# Patient Record
Sex: FEMALE | Race: WHITE
[De-identification: names, ages, dates, MRNs, and addresses within clinical notes are randomized per-mention and may not be internally consistent; named-entity substitution may affect disease eponyms.]

---

## 2017-01-30 ENCOUNTER — HOSPITAL ENCOUNTER (OUTPATIENT)
Dept: HOSPITAL 45 - C.LABSPEC | Age: 27
Discharge: HOME | End: 2017-01-30
Attending: OBSTETRICS & GYNECOLOGY
Payer: COMMERCIAL

## 2017-01-30 DIAGNOSIS — Z3A.00: ICD-10-CM

## 2017-01-30 DIAGNOSIS — O34.219: Primary | ICD-10-CM

## 2017-01-30 LAB
APPEARANCE UR: CLEAR
BILIRUB UR-MCNC: (no result) MG/DL
COLOR UR: YELLOW
MANUAL MICROSCOPIC REQUIRED?: NO
NITRITE UR QL STRIP: (no result)
PH UR STRIP: 5 [PH] (ref 4.5–7.5)
REVIEW REQ?: NO
SP GR UR STRIP: 1.02 (ref 1–1.03)
URINE BILL WITH OR WITHOUT MIC: (no result)
UROBILINOGEN UR-MCNC: (no result) MG/DL

## 2017-02-16 ENCOUNTER — HOSPITAL ENCOUNTER (OUTPATIENT)
Dept: HOSPITAL 45 - C.LAB1850 | Age: 27
Discharge: HOME | End: 2017-02-16
Attending: OBSTETRICS & GYNECOLOGY
Payer: COMMERCIAL

## 2017-02-16 DIAGNOSIS — Z3A.00: ICD-10-CM

## 2017-02-16 DIAGNOSIS — O34.219: Primary | ICD-10-CM

## 2017-02-16 LAB
BASOPHILS # BLD: 0.01 K/UL (ref 0–0.2)
BASOPHILS NFR BLD: 0.1 %
COMPLETE: YES
EOSINOPHIL NFR BLD AUTO: 235 K/UL (ref 130–400)
HCT VFR BLD CALC: 37.7 % (ref 37–47)
IG%: 0.3 %
IMM GRANULOCYTES NFR BLD AUTO: 26.6 %
LYMPHOCYTES # BLD: 2.66 K/UL (ref 1.2–3.4)
MCH RBC QN AUTO: 29.3 PG (ref 25–34)
MCHC RBC AUTO-ENTMCNC: 34.5 G/DL (ref 32–36)
MCV RBC AUTO: 84.9 FL (ref 80–100)
MONOCYTES NFR BLD: 4 %
NEUTROPHILS # BLD AUTO: 0.6 %
NEUTROPHILS NFR BLD AUTO: 68.4 %
PMV BLD AUTO: 12 FL (ref 7.4–10.4)
RBC # BLD AUTO: 4.44 M/UL (ref 4.2–5.4)
WBC # BLD AUTO: 10 K/UL (ref 4.8–10.8)

## 2017-02-21 LAB
CHLAMYDIA TRACH RNA***: NOT DETECTED
GC (NEIS GONORRHOEAE)RNA**: NOT DETECTED

## 2017-04-13 ENCOUNTER — HOSPITAL ENCOUNTER (OUTPATIENT)
Dept: HOSPITAL 45 - C.LAB1850 | Age: 27
End: 2017-04-13
Attending: OBSTETRICS & GYNECOLOGY
Payer: COMMERCIAL

## 2017-04-13 DIAGNOSIS — Z34.90: Primary | ICD-10-CM

## 2017-04-13 LAB — GTGD: 50 GRAMS

## 2017-07-11 ENCOUNTER — HOSPITAL ENCOUNTER (OUTPATIENT)
Dept: HOSPITAL 45 - C.LABSPEC | Age: 27
Discharge: HOME | End: 2017-07-11
Attending: OBSTETRICS & GYNECOLOGY
Payer: COMMERCIAL

## 2017-07-11 ENCOUNTER — HOSPITAL ENCOUNTER (OUTPATIENT)
Dept: HOSPITAL 45 - C.LAB1850 | Age: 27
Discharge: HOME | End: 2017-07-11
Attending: OBSTETRICS & GYNECOLOGY
Payer: COMMERCIAL

## 2017-07-11 DIAGNOSIS — Z34.83: Primary | ICD-10-CM

## 2017-07-11 LAB
APPEARANCE UR: CLEAR
BILIRUB UR-MCNC: (no result) MG/DL
COLOR UR: YELLOW
GTGD: 50 GRAMS
HCT VFR BLD CALC: 31.8 % (ref 37–47)
MANUAL MICROSCOPIC REQUIRED?: NO
NITRITE UR QL STRIP: (no result)
PH UR STRIP: 6.5 [PH] (ref 4.5–7.5)
REVIEW REQ?: NO
SP GR UR STRIP: 1.02 (ref 1–1.03)
URINE EPITHELIAL CELL AUTO: >30 /LPF (ref 0–5)
UROBILINOGEN UR-MCNC: (no result) MG/DL

## 2017-08-24 ENCOUNTER — HOSPITAL ENCOUNTER (OUTPATIENT)
Dept: HOSPITAL 45 - C.LABSPEC | Age: 27
Discharge: HOME | End: 2017-08-24
Attending: OBSTETRICS & GYNECOLOGY
Payer: COMMERCIAL

## 2017-08-24 DIAGNOSIS — Z34.83: Primary | ICD-10-CM

## 2017-09-14 ENCOUNTER — HOSPITAL ENCOUNTER (OUTPATIENT)
Dept: HOSPITAL 45 - C.LAB | Age: 27
Discharge: HOME | End: 2017-09-14
Attending: OBSTETRICS & GYNECOLOGY
Payer: COMMERCIAL

## 2017-09-14 VITALS
WEIGHT: 152.34 LBS | WEIGHT: 152.34 LBS | BODY MASS INDEX: 26.01 KG/M2 | HEIGHT: 64.02 IN | BODY MASS INDEX: 26.01 KG/M2 | HEIGHT: 64.02 IN

## 2017-09-14 DIAGNOSIS — Z01.818: Primary | ICD-10-CM

## 2017-09-14 LAB
APPEARANCE UR: CLEAR
BASOPHILS # BLD: 0.02 K/UL (ref 0–0.2)
BASOPHILS NFR BLD: 0.2 %
BILIRUB UR-MCNC: (no result) MG/DL
COLOR UR: YELLOW
COMPLETE: YES
EOSINOPHIL NFR BLD AUTO: 200 K/UL (ref 130–400)
HCT VFR BLD CALC: 33.2 % (ref 37–47)
IG%: 0.4 %
IMM GRANULOCYTES NFR BLD AUTO: 22.1 %
LYMPHOCYTES # BLD: 2.34 K/UL (ref 1.2–3.4)
MANUAL MICROSCOPIC REQUIRED?: NO
MCH RBC QN AUTO: 24.2 PG (ref 25–34)
MCHC RBC AUTO-ENTMCNC: 31.3 G/DL (ref 32–36)
MCV RBC AUTO: 77.4 FL (ref 80–100)
MONOCYTES NFR BLD: 4.7 %
NEUTROPHILS # BLD AUTO: 0.5 %
NEUTROPHILS NFR BLD AUTO: 72.1 %
NITRITE UR QL STRIP: (no result)
PH UR STRIP: 6 [PH] (ref 4.5–7.5)
RBC # BLD AUTO: 4.29 M/UL (ref 4.2–5.4)
REVIEW REQ?: NO
SP GR UR STRIP: 1.01 (ref 1–1.03)
URINE BILL WITH OR WITHOUT MIC: (no result)
URINE EPITHELIAL CELL AUTO: >30 /LPF (ref 0–5)
UROBILINOGEN UR-MCNC: (no result) MG/DL
WBC # BLD AUTO: 10.61 K/UL (ref 4.8–10.8)

## 2017-09-14 NOTE — PAT MEDICATION INSTRUCTIONS
Service Date


Sep 14, 2017.





Current Home Medication List


Multivit/Min/Iron/Fol Ac/Pren (Prenatal Vitamin), 1 TAB PO QAM





Medication Instructions


For Your Scheduled Surgery 





- Hold the following medications the morning of surgery:


Multivit/Min/Iron/Fol Ac/Pren (Prenatal Vitamin), 1 TAB PO QAM











If you have any questions please call us at [phone] or [phone] or 

[phone]

## 2017-09-17 ENCOUNTER — HOSPITAL ENCOUNTER (INPATIENT)
Dept: HOSPITAL 45 - C.OPB | Age: 27
LOS: 2 days | Discharge: HOME | End: 2017-09-19
Attending: OBSTETRICS & GYNECOLOGY | Admitting: OBSTETRICS & GYNECOLOGY
Payer: COMMERCIAL

## 2017-09-17 VITALS
HEIGHT: 64.02 IN | BODY MASS INDEX: 25.97 KG/M2 | BODY MASS INDEX: 25.97 KG/M2 | HEIGHT: 64.02 IN | WEIGHT: 152.12 LBS | WEIGHT: 152.12 LBS

## 2017-09-17 VITALS
DIASTOLIC BLOOD PRESSURE: 64 MMHG | HEART RATE: 92 BPM | OXYGEN SATURATION: 99 % | SYSTOLIC BLOOD PRESSURE: 101 MMHG | TEMPERATURE: 97.7 F

## 2017-09-17 VITALS — DIASTOLIC BLOOD PRESSURE: 76 MMHG | SYSTOLIC BLOOD PRESSURE: 118 MMHG | TEMPERATURE: 99.32 F | HEART RATE: 105 BPM

## 2017-09-17 VITALS — HEART RATE: 116 BPM | TEMPERATURE: 97.7 F | SYSTOLIC BLOOD PRESSURE: 111 MMHG | DIASTOLIC BLOOD PRESSURE: 72 MMHG

## 2017-09-17 VITALS
SYSTOLIC BLOOD PRESSURE: 106 MMHG | OXYGEN SATURATION: 98 % | DIASTOLIC BLOOD PRESSURE: 69 MMHG | HEART RATE: 96 BPM | TEMPERATURE: 97.88 F

## 2017-09-17 DIAGNOSIS — D64.9: ICD-10-CM

## 2017-09-17 DIAGNOSIS — N85.8: ICD-10-CM

## 2017-09-17 DIAGNOSIS — O22.43: ICD-10-CM

## 2017-09-17 DIAGNOSIS — O34.219: Primary | ICD-10-CM

## 2017-09-17 DIAGNOSIS — Z3A.40: ICD-10-CM

## 2017-09-17 LAB
EOSINOPHIL NFR BLD AUTO: 209 K/UL (ref 130–400)
HCT VFR BLD CALC: 33.3 % (ref 37–47)
MCH RBC QN AUTO: 24.9 PG (ref 25–34)
MCHC RBC AUTO-ENTMCNC: 32.1 G/DL (ref 32–36)
MCV RBC AUTO: 77.4 FL (ref 80–100)
PMV BLD AUTO: 12.3 FL (ref 7.4–10.4)
RBC # BLD AUTO: 4.3 M/UL (ref 4.2–5.4)
WBC # BLD AUTO: 14.31 K/UL (ref 4.8–10.8)

## 2017-09-17 PROCEDURE — 0HQ9XZZ REPAIR PERINEUM SKIN, EXTERNAL APPROACH: ICD-10-PCS | Performed by: OBSTETRICS & GYNECOLOGY

## 2017-09-17 RX ADMIN — Medication PRN MG: at 15:34

## 2017-09-17 RX ADMIN — Medication PRN MG: at 19:14

## 2017-09-17 RX ADMIN — DOCUSATE SODIUM SCH MG: 100 CAPSULE, LIQUID FILLED ORAL at 19:49

## 2017-09-17 NOTE — DELIVERY SUMMARY
DATE OF OPERATION:  2017

 

The patient is a 27-year-old  2, para 1-0-0-1 white female, who

presents on her due date in active labor.  Her prior pregnancy had been

complicated by an emergency  section.  The patient was requesting a

trial of labor at this pregnancy.  She progressed to full dilation after

rupture of membranes with clear fluids.  She pushed effectively over intact

perineum for delivery of a viable female infant.  Mouth and nasopharynx were

suctioned on the perineum.  The rest of the infant delivered easily and was

placed on the mother's abdomen for further stimulation and drying.  There was

vigorous crying and the infant was moving all 4 limbs.  The cord was clamped

and cut after 30 seconds and the placenta was expressed intact with a

3-vessel cord.  The first degree perineal laceration was repaired with 3-0

chromic in the usual fashion.  She does have an edematous external hemorrhoid

also present, measuring 2-3 cm in diameter.  It is not red or thrombosed at

this time.  Estimated blood loss is 300 mL.  Mother and infant were doing

well after delivery.

 

 

I attest to the content of the Intraoperative Record and any orders documented therein. Any exception
s are noted below.

## 2017-09-17 NOTE — ANESTHESIA PROCEDURE NOTE
Anesthesia Epidural Removal Nt


Date & Time


Sep 17, 2017 at 10:42





Vital Signs


Pain Intensity:  0.0





Notes


Mental Status:  alert / awake / arousable, participated in evaluation


Nausea / Vomiting:  adequately controlled


Pain:  adequately controlled


Airway Patency, RR, SpO2:  stable & adequate


BP & HR:  stable & adequate


Hydration State:  stable & adequate


Neuraxial Anesthesia:  was administered, sensory block is resolving


Anesthetic Complications:  no major complications apparent, pt satisfied with 

anesthetic care


Epidural:  removed without complications, with tip intact

## 2017-09-18 VITALS
TEMPERATURE: 98.06 F | SYSTOLIC BLOOD PRESSURE: 100 MMHG | OXYGEN SATURATION: 98 % | DIASTOLIC BLOOD PRESSURE: 64 MMHG | HEART RATE: 96 BPM

## 2017-09-18 VITALS
HEART RATE: 80 BPM | SYSTOLIC BLOOD PRESSURE: 108 MMHG | OXYGEN SATURATION: 100 % | DIASTOLIC BLOOD PRESSURE: 74 MMHG | TEMPERATURE: 97.7 F

## 2017-09-18 VITALS
OXYGEN SATURATION: 100 % | DIASTOLIC BLOOD PRESSURE: 54 MMHG | HEART RATE: 86 BPM | TEMPERATURE: 98.06 F | SYSTOLIC BLOOD PRESSURE: 95 MMHG

## 2017-09-18 VITALS
TEMPERATURE: 98.06 F | OXYGEN SATURATION: 99 % | DIASTOLIC BLOOD PRESSURE: 69 MMHG | SYSTOLIC BLOOD PRESSURE: 103 MMHG | HEART RATE: 79 BPM

## 2017-09-18 LAB — HCT VFR BLD CALC: 30.4 % (ref 37–47)

## 2017-09-18 RX ADMIN — Medication PRN MG: at 02:44

## 2017-09-18 RX ADMIN — DOCUSATE SODIUM SCH MG: 100 CAPSULE, LIQUID FILLED ORAL at 10:22

## 2017-09-18 RX ADMIN — Medication SCH TAB: at 10:22

## 2017-09-18 RX ADMIN — Medication PRN MG: at 19:49

## 2017-09-18 RX ADMIN — Medication PRN MG: at 16:04

## 2017-09-18 RX ADMIN — Medication PRN MG: at 06:53

## 2017-09-18 RX ADMIN — DOCUSATE SODIUM SCH MG: 100 CAPSULE, LIQUID FILLED ORAL at 19:48

## 2017-09-18 RX ADMIN — Medication SCH GM: at 08:00

## 2017-09-18 NOTE — PROGRESS NOTE
Subjective


Sep 18, 2017.


Subjective


conversation w/ patient, physical exam, chart review, lab review


Ambulation:  ambulating normally


Voiding:  no voiding problems


Passing Gas:  Yes


Diet Tolerance:  Regular Diet


Lochia:  Moderate


Feeding Type:  Breast Feeding


Pain:  MODERATE - AT UTERUS





Review of Systems


Respiratory:  No shortness of breath


Cardiac:  No chest pain


Abdomen:  No nausea, No vomiting


Female :  No dysuria





Objective


Vital Signs











  Date Time  Temp Pulse Resp B/P (MAP) Pulse Ox O2 Delivery O2 Flow Rate FiO2


 


9/18/17 04:30 36.7 79 16 103/69 (80) 99 Room Air  


 


9/17/17 23:00      Room Air  


 


9/17/17 23:00 36.5 92 16 101/64 (76) 99 Room Air  


 


9/17/17 19:50      Room Air  


 


9/17/17 19:50 36.6 96 16 106/69 (81) 98 Room Air  


 


9/17/17 15:30 37.4 105 20 118/76 (90)  Room Air  


 


9/17/17 15:30      Room Air  


 


9/17/17 12:05      Room Air  


 


9/17/17 12:05 36.5 116 20 111/72 (85)  Room Air  











Physical Exam


General Appearance:  WELL-APPEARING, WD/WN, NO APPARENT DISTRESS


Respiratory/Chest:  lungs clear, normal breath sounds, no respiratory distress


Cardiovascular:  regular rate, rhythm, no gallop


Abdomen:  normal bowel sounds, soft


Fundus:  Firm, Tender (APPROPRIATELY TENDER), Relation to Umbilicus (AT LEVEL 

OF u)


Extremities:  no calf tenderness





Laboratory Results





Last 24 Hours








Test


  9/18/17


04:44











Assessment and Plan


Post-Partum


Day#:  1


Continue Routine Care:


Resident Physician Supervision Note:





I interviewed and examined the patient. Discussed with Dr. Miller and agree 

with findings and plan as documented in the note. Any exceptions or 

clarifications are listed here: [None]





Documented By:  Mag Miller


- Vital Signs reviewed and WNL.


- Hgb pending. (on admission was 10.7.)


- Blood Type: O+, GBS - , Rubella Immune.


- Pt is doing well clinically. 


- Monitor and Control pain with Motrin PRN, resume regular diet, Monitor Lochia.


- Encourage breast feeding.  


- Continue routine post op care.





SHABBIR MILLER PGY1 FM RESIDENT





Resident Tracking


Resident Involvement:  Resident Care Provided


Care Provided:  OB Delivery

## 2017-09-18 NOTE — DISCHARGE INSTRUCTIONS
Discharge Instructions


Date of Service


Sep 18, 2017.





Admission


Reason for Admission:  Normal Labor, Pregnancy With 39 Completed Weeks





Discharge


Discharge Diagnosis / Problem:  DELIVERY VAGINAL





Discharge Goals


Goal(s):  Routine recovery after delivery





Medications


Continue Dispensed Medications:  supercream, dermaplast, tucks, lansinoh





Activity Recommendations


Activity Limitations:  per Instructions/Follow-up section





.





Instructions / Follow-Up


Instructions / Follow-Up





ACTIVITY RECOMMENDATIONS:





* Gradual return to full activity over the next 2-3 weeks.


* No lifting - nothing heavier than baby over the next 2-3 weeks.


* Do not engage in vigorous exercise, sexual activity or sports until cleared by


   your physician.


* Do not drive or operate any motorized equipment until cleared by your 

physician.


* You may shower/bathe daily.








MEDICATIONS:





For discomfort or pain, you may use Acetaminophen (Tylenol), Ibuprofen (Advil),


or Naproxen (Aleve) following the package directions. For constipation you may 


use Colace following the package directions.








BREAST CARE:





If you are not breast feeding:





*  Wear a supportive bra 24 hours a day for one to two weeks.


*  Avoid stimulating your breasts and nipples as much as possible during the 

first 


    few weeks after delivery.


*  When taking a shower, have the warm water hit your back, not breasts.


*  When your breasts feel full, apply ice packs.  Usually three to four times a 

day


    helps ease the discomfort.


*  Take a mild pain medication (Tylenol / Motrin) when you are uncomfortable.





If breast feeding:





*  Use breast milk to lubricate nipples.  Lansinoh cream may be used for sore 

nipples. 


    You do not need to remove cream prior to breast feeding.  If using a 

different brand


   of cream, check the label for directions regarding removal of cream prior to 

nursing.


*  Wear a supportive bra.


*  If having problems with breasts or breast feeding, call a lactation 

consultant 


    or your health care provider.








EPISIOTOMY CARE:





After delivery, if you have an episiotomy (stitches), the following steps will 

ease


discomfort and aid healing.





*  For the first 24 hours after delivery, place ice packs next to your 

episiotomy to


   help reduce swelling.


*  After the first 24 hour-period, sitz baths, either portable or in the tub, 

are suggested.


    A shower with a shower arm sprayed over the episiotomy may be comforting.


*  Alexia care should be done after each voiding and bowel movement.  Squirt warm 

water


    from a plastic bottle over the perineum (region of the body between the 

anus and 


    urinary opening) and pat dry.


*  Use Dermoplast to ease discomfort.  Shake container.  Spray directly over 

the 


    episiotomy.  Place a Tucks on a clean sanitary pad next to your episiotomy.








SPECIAL CARE INSTRUCTIONS:





When you are discharged from the hospital, it is important for you to follow 

the instructions 


listed below:





*  During the first week at home, you should be able to care for yourself and 

your baby.


    In addition, the usual light household activities are encouraged.





*  Limit your activities to the way you feel.  Do not try to clean the house or 

move 


    furniture. Be sensible.





*  If you actively engage in sports and have done so up until the time of your 

delivery, 


    you may resume these activities as soon as you feel able.  This may take up 

to one 


    month or even longer.  Use good judgment.





*  Continue to take your prenatal vitamins for at least six weeks after the 

birth of your baby.





*  Your diet need not be limited unless you were on a special diet before your 

delivery.  


    Breast-feeding mothers need around 2500 calories per day and at least 64-80 

ounces of 


    fluid per day (8 to 10 glasses).





*  You should eat foods from the four major food groups.  Crash diets or fad 

diets are to be 


    avoided.  Eating lean meats, fresh fruits and vegetables, low-fat dairy 

products, high fiber


    foods and a regular exercise program, will help you get back to your pre-

pregnancy weight


    without putting your health at risk.





*  Constipation is sometimes a problem after delivery.  Take a mild laxative as 

needed.  If 


    breast feeding, Milk of Magnesia is acceptable to use. You may use a 

suppository or Fleets


    enema if no episiotomy.





*  A daily shower or tub bath is suggested.  Be sure to thoroughly and gently 

dry the perineum.





*  A bloody vaginal discharge will usually continue until around four weeks 

post partum.  A 


    small amount of bleeding may continue for as long as six weeks.  Vaginal 

discharge changes


    from the bright red bleeding after delivery to pink then brownish and 

finally yellowish-pink 


    before becoming white and disappearing.





*  Bleeding may increase with activity.  Your first period may come in 4-8 

weeks.  If you are 


    breast feeding, your period may be delayed even longer.





*  [place] (sex) can begin whenever both you and your partner feel 

comfortable and do 


    not have any form of genital infection.  It is recommended that you wait at 

least six weeks


    for internal and external healing to occur.  If you have questions, please 

talk to your health


    care practitioner.  A condom should be used to prevent infection and 

pregnancy.





*  Foreplay, gentle intercourse and lubrication is very important the first 

several times to 


    prevent pain.  A water-based lubricant such as K-Y jelly or Astroglide may 

be used.





*  If you have RH negative blood and your baby is RH positive, you will receive 

RHOGAM by 


    injection prior to discharge.  The nurse will give you a card to keep with 

you that has the


    date and place that you received RHOGAM after delivery.





*  During your prenatal care, you had a Rubella screen done to check for the 

presence of 


    rubella antibodies in your blood.  If your test was negative, you will 

receive a Rubella 


    vaccine prior to discharge.  This vaccine may cause a fever, soreness at 

the injection site


    and flu-like symptoms.  If these symptoms persist, notify your health care 

practitioner.  


    Pregnancy is not advised for one month after a Rubella vaccine.





*  Verbalizes understanding of car seat law as reviewed with patient nursing.





*  Car Seat hand-out given and reviewed with patient by nursing.





*  Shaken baby information reviewed with patient by nursing.


 


Call you doctor if:





*  Heavy bleeding (saturating several pads an hour) or passing clots the size 

of your fist.


*  A fever >101 degrees F (38.3 degrees C) on two occasions four hours apart and

/or chills.


*  Unusual pain in the pelvic or vaginal areas.


* "Baby Blues" lasting longer than two weeks.





If you have any questions or concerns, call your health care practitioner at 


[phone].








FOLLOW UP VISIT:





*  Please call the office at [phone] to schedule a 6 week postpartum 


    examination.  It is important you keep this appointment.  It is important 


    for you to make arrangements for either yearly or twice yearly check-ups 


    thereafter.





Current Hospital Diet


Patient's current hospital diet: Regular OB Diet





Discharge Diet


Recommended Diet:  Regular Diet





Pending Studies


Studies pending at discharge:  no





Medical Emergencies








.


Who to Call and When:





Medical Emergencies:  If at any time you feel your situation is an emergency, 

please call 911 immediately.





.





Non-Emergent Contact


Non-Emergency issues call your:  Primary Care Provider





.


.








"Provider Documentation" section prepared by Mary Jane Miller.








.





VTE Core Measure


Inpt VTE Proph given/why not?:  Treatment not indicated

## 2017-09-19 VITALS — TEMPERATURE: 98.24 F | SYSTOLIC BLOOD PRESSURE: 118 MMHG | DIASTOLIC BLOOD PRESSURE: 77 MMHG | HEART RATE: 118 BPM

## 2017-09-19 VITALS — DIASTOLIC BLOOD PRESSURE: 77 MMHG | HEART RATE: 118 BPM | TEMPERATURE: 98.24 F

## 2017-09-19 LAB — HCT VFR BLD CALC: 30.9 % (ref 37–47)

## 2017-09-19 RX ADMIN — Medication SCH GM: at 08:57

## 2017-09-19 RX ADMIN — Medication PRN MG: at 08:57

## 2017-09-19 RX ADMIN — DOCUSATE SODIUM SCH MG: 100 CAPSULE, LIQUID FILLED ORAL at 08:56

## 2017-09-19 RX ADMIN — Medication SCH TAB: at 08:56

## 2017-09-19 NOTE — PROGRESS NOTE
Subjective


Sep 19, 2017.


Subjective


conversation w/ patient, physical exam, chart review, lab review


Ambulation:  ambulating normally


Voiding:  no voiding problems


Passing Gas:  Yes


Diet Tolerance:  Regular Diet


Lochia:  Moderate


Feeding Type:  Breast Feeding


Pain:  controlled


Comment:


Pt complains of burning at laceration site when she urinates.





Review of Systems


Respiratory:  No shortness of breath


Cardiac:  No chest pain


Abdomen:  No nausea, No vomiting


Female :  No dysuria





Objective


Vital Signs











  Date Time  Temp Pulse Resp B/P (MAP) Pulse Ox O2 Delivery O2 Flow Rate FiO2


 


9/18/17 23:50 36.7 96 16 100/64 (76) 98 Room Air  


 


9/18/17 23:50     98 Room Air  


 


9/18/17 16:20      Room Air  


 


9/18/17 15:00 36.7 86 18 95/54 (68) 100 Room Air  


 


9/18/17 07:45     100 Room Air  


 


9/18/17 07:45 36.5 80 18 108/74 (85) 100 Room Air  











Physical Exam


General Appearance:  WELL-APPEARING, WD/WN, NO APPARENT DISTRESS


Respiratory/Chest:  lungs clear, normal breath sounds, no respiratory distress


Cardiovascular:  regular rate, rhythm, no gallop


Abdomen:  normal bowel sounds, soft


Fundus:  Firm, Tender (appropriately tender), Relation to Umbilicus (at level 

of U)


Extremities:  no calf tenderness





Laboratory Results





Last 24 Hours








Test


  9/18/17


07:04 9/19/17


04:44


 


Hemoglobin 9.0 g/dL  


 


Hematocrit 30.4 %  











Assessment and Plan


Post-Partum


Day#:  2


Continue Routine Care:


- Vital Signs reviewed and WNL.


- Hgb 9.6. Trending up. (on admission was 10.7. 9/18 was 9.0.)


- Blood Type: O+, GBS - , Rubella Immune.


- Pt is doing well clinically. 


- Monitor and Control pain with Motrin PRN, resume regular diet, Monitor Lochia.


- Encourage breast feeding.  


- Pt counselled on discharge instructions.





SHABBIR HONG PGY1 FM RESIDENT





Resident Physician Supervision Note:





I interviewed and examined the patient. Discussed with Dr. hong and agree 

with findings and plan as documented in the note. Any exceptions or 

clarifications are listed here: [None]





Documented By:  Oliver Valentin





Resident Tracking


Resident Involvement:  Resident Care Provided


Care Provided:  OB Delivery

## 2017-09-21 NOTE — CODING QUERY NO DIAGNOSIS
: 1990

TREATMENT RENDERED WITHOUT A DIAGNOSIS                                                  



To promote full compliance with coding requirements relating to patient care, physician 
participation is requested in all cases of  uncertainty.  Please assist us with 
providing a diagnosis/symptom for the test(s) below:



A diagnosis/symptom was not documented on your Order.  A valid diagnosis/symptom is 
required to bill all insurances.



**Please remember that we are unable to code a diagnosis of rule out, probable, possible, 
questionable, or suspected.  



Tests that require a diagnosis:

DOS: 17

* CBC                    DIAGNOSIS:

* URINALYSIS                DIAGNOSIS:



















Provider Signature: _____________________________ Date: _________



Thank you  

Criselda Mulligan

Health Information Management

Phone:  [phone]

Fax:  [phone]



Once completed, please kindly fax back to [phone]



For questions please call [phone]

## 2017-11-20 ENCOUNTER — HOSPITAL ENCOUNTER (OUTPATIENT)
Dept: HOSPITAL 45 - C.LAB1850 | Age: 27
Discharge: HOME | End: 2017-11-20
Attending: PHYSICIAN ASSISTANT
Payer: COMMERCIAL

## 2017-11-20 DIAGNOSIS — Z30.9: Primary | ICD-10-CM

## 2017-11-20 LAB
PREG INTERNAL NEGATIVE QC: (no result)
PREG INTERNAL POSITIVE QC: (no result)

## 2018-03-02 ENCOUNTER — HOSPITAL ENCOUNTER (OUTPATIENT)
Dept: HOSPITAL 45 - C.RAD | Age: 28
Discharge: HOME | End: 2018-03-02
Attending: FAMILY MEDICINE
Payer: COMMERCIAL

## 2018-03-02 DIAGNOSIS — M54.5: Primary | ICD-10-CM

## 2018-03-02 NOTE — DIAGNOSTIC IMAGING REPORT
SACRUM   COCCYX MIN 2 VIEWS



CLINICAL HISTORY: Low back pain.    



COMPARISON STUDY:  CT of the pelvis November 24, 2015.



FINDINGS: Sacroiliac joints are intact. There is no acute fracture or suspicious

lesion within the sacrum or the coccyx. There is mild osteoarthritis of the

bilateral sacroiliac joints.



IMPRESSION:  



1. No acute fracture within the sacrum or coccyx. 



2. 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









Electronically signed by:  Shayan Goff M.D.

3/2/2018 12:56 PM



Dictated Date/Time:  3/2/2018 12:54 PM

## 2018-03-02 NOTE — DIAGNOSTIC IMAGING REPORT
LUMBAR SPINE RADIOGRAPHS



CLINICAL HISTORY: LOW BACK PAIN    



COMPARISON: None



FINDINGS:  There is slight rightward curvature of the lumbar spine. Alignment is

otherwise anatomic. Vertebral body heights are maintained. There are 6 lumbar

type vertebra. Disc spaces are preserved.



IMPRESSION: 



1. No lumbar spine fracture or subluxation.



2. Minimal rightward curvature of the lumbar spine. 







Electronically signed by:  Shayan Goff M.D.

3/2/2018 1:01 PM



Dictated Date/Time:  3/2/2018 1:00 PM